# Patient Record
Sex: FEMALE | Race: WHITE | NOT HISPANIC OR LATINO | Employment: FULL TIME | URBAN - METROPOLITAN AREA
[De-identification: names, ages, dates, MRNs, and addresses within clinical notes are randomized per-mention and may not be internally consistent; named-entity substitution may affect disease eponyms.]

---

## 2021-03-01 ENCOUNTER — OFFICE VISIT (OUTPATIENT)
Dept: ENDOCRINOLOGY | Facility: CLINIC | Age: 31
End: 2021-03-01

## 2021-03-01 VITALS
DIASTOLIC BLOOD PRESSURE: 64 MMHG | HEART RATE: 100 BPM | BODY MASS INDEX: 24.79 KG/M2 | HEIGHT: 64 IN | SYSTOLIC BLOOD PRESSURE: 104 MMHG | OXYGEN SATURATION: 99 % | WEIGHT: 145.2 LBS | TEMPERATURE: 99.3 F

## 2021-03-01 DIAGNOSIS — E05.00 GRAVES' DISEASE: Primary | ICD-10-CM

## 2021-03-01 PROCEDURE — 99204 OFFICE O/P NEW MOD 45 MIN: CPT | Performed by: INTERNAL MEDICINE

## 2021-03-01 RX ORDER — ATENOLOL 25 MG/1
TABLET ORAL
COMMUNITY
Start: 2021-02-19 | End: 2021-04-05

## 2021-03-01 RX ORDER — ESCITALOPRAM OXALATE 10 MG/1
10 TABLET ORAL EVERY MORNING
COMMUNITY
Start: 2021-02-25 | End: 2021-10-18

## 2021-03-01 RX ORDER — CLONAZEPAM 0.5 MG/1
0.5 TABLET ORAL AS NEEDED
COMMUNITY
Start: 2021-02-22 | End: 2021-10-18

## 2021-03-01 RX ORDER — PROPYLTHIOURACIL 50 MG/1
50 TABLET ORAL 3 TIMES DAILY
COMMUNITY
End: 2021-04-08 | Stop reason: HOSPADM

## 2021-03-01 NOTE — ASSESSMENT & PLAN NOTE
A new problem for me.  She has gone from hyper to hypo to normal to hyper to hypo again. We know that 300 mg of ptu per day is too much, 0 is not enough and 150 is too much.  I think she needs a different treatment plan. As she is hypothyroid now , I would stop her beta blocker.  She ought to consider thyroidectomy.  If she decides not to go with that, then combining ptu with levothyroxine might be the way to go

## 2021-03-01 NOTE — PROGRESS NOTES
"     Office Note      Date: 2021  Patient Name: Maye Madlonado  MRN: [id number]  : 1990    Chief Complaint   Patient presents with   • Graves' Disease   • Hyperthyroidism       History of Present Illness:   Maye Maldonado is a 30 y.o. female who presents for Graves' Disease and Hyperthyroidism   She is seen as a new pt today.  She was diagnosed with graves disease last summer and treated with 300 mg of ptu per day  She became severely hypothyroid on that. The dose was stopped. She was then hyperthyroid and was put on 150 mg per day. Now tsh is about 10.  She notes hair loss and fatigue.  She is trying to conceive   Subjective      Patient was born where: [place].  Facial radiation exposure: No.  High iodine intake: No  Family hx of thyroid disease: Yes, describe: mgm.    Review of Systems:   Review of Systems   Constitutional: Positive for fatigue.   HENT: Negative.    Respiratory: Negative.    Cardiovascular: Negative.    Gastrointestinal: Negative.    Genitourinary: Negative.    Musculoskeletal: Negative.    Skin: Negative.         Hair loss   Neurological: Positive for tremors and weakness.   Hematological: Negative.    Psychiatric/Behavioral: Negative.        The following portions of the patient's history were reviewed and updated as appropriate: allergies, current medications, past family history, past medical history, past social history, past surgical history and problem list.    Objective     Visit Vitals  /64   Pulse 100   Temp 99.3 °F (37.4 °C) (Infrared)   Ht 162.6 cm (64\")   Wt 65.9 kg (145 lb 3.2 oz)   SpO2 99%   BMI 24.92 kg/m²       Labs:    Physical Exam:  Physical Exam  Vitals signs reviewed.   Constitutional:       General: She is not in acute distress.     Appearance: She is normal weight. She is not ill-appearing, toxic-appearing or diaphoretic.   HENT:      Head: Normocephalic and atraumatic.   Eyes:      Extraocular Movements: Extraocular movements intact.      Pupils: Pupils " are equal, round, and reactive to light.   Neck:      Comments: + goiter  Cardiovascular:      Rate and Rhythm: Regular rhythm. Tachycardia present.      Pulses: Normal pulses.      Heart sounds: No murmur.   Pulmonary:      Effort: Pulmonary effort is normal. No respiratory distress.   Musculoskeletal: Normal range of motion.         General: No swelling.   Lymphadenopathy:      Cervical: No cervical adenopathy.   Skin:     General: Skin is warm.      Coloration: Skin is not jaundiced.   Neurological:      General: No focal deficit present.      Mental Status: She is alert and oriented to person, place, and time.   Psychiatric:         Mood and Affect: Mood normal.         Thought Content: Thought content normal.         Judgment: Judgment normal.         Assessment / Plan      Assessment & Plan:  Problem List Items Addressed This Visit        Other    Graves' disease - Primary    Current Assessment & Plan     A new problem for me.  She has gone from hyper to hypo to normal to hyper to hypo again. We know that 300 mg of ptu per day is too much, 0 is not enough and 150 is too much.  I think she needs a different treatment plan. As she is hypothyroid now , I would stop her beta blocker.  She ought to consider thyroidectomy.  If she decides not to go with that, then combining ptu with levothyroxine might be the way to go          Relevant Medications    atenolol (TENORMIN) 25 MG tablet    propylthiouracil (PTU) 50 MG tablet           Bharath Argueta MD   03/01/2021

## 2021-03-02 DIAGNOSIS — E05.00 GRAVES' DISEASE: Primary | ICD-10-CM

## 2021-04-05 ENCOUNTER — APPOINTMENT (OUTPATIENT)
Dept: PREADMISSION TESTING | Facility: HOSPITAL | Age: 31
End: 2021-04-05

## 2021-04-05 VITALS — HEIGHT: 64 IN | BODY MASS INDEX: 25.54 KG/M2 | WEIGHT: 149.6 LBS

## 2021-04-05 LAB
ANION GAP SERPL CALCULATED.3IONS-SCNC: 9 MMOL/L (ref 5–15)
BUN SERPL-MCNC: 9 MG/DL (ref 6–20)
BUN/CREAT SERPL: 13.2 (ref 7–25)
CALCIUM SPEC-SCNC: 8.6 MG/DL (ref 8.6–10.5)
CHLORIDE SERPL-SCNC: 104 MMOL/L (ref 98–107)
CO2 SERPL-SCNC: 26 MMOL/L (ref 22–29)
CREAT SERPL-MCNC: 0.68 MG/DL (ref 0.57–1)
DEPRECATED RDW RBC AUTO: 40 FL (ref 37–54)
ERYTHROCYTE [DISTWIDTH] IN BLOOD BY AUTOMATED COUNT: 13.2 % (ref 12.3–15.4)
GFR SERPL CREATININE-BSD FRML MDRD: 100 ML/MIN/1.73
GLUCOSE SERPL-MCNC: 97 MG/DL (ref 65–99)
HCT VFR BLD AUTO: 37.8 % (ref 34–46.6)
HGB BLD-MCNC: 11.8 G/DL (ref 12–15.9)
MCH RBC QN AUTO: 26.2 PG (ref 26.6–33)
MCHC RBC AUTO-ENTMCNC: 31.2 G/DL (ref 31.5–35.7)
MCV RBC AUTO: 84 FL (ref 79–97)
PLATELET # BLD AUTO: 152 10*3/MM3 (ref 140–450)
PMV BLD AUTO: 9.9 FL (ref 6–12)
POTASSIUM SERPL-SCNC: 4.2 MMOL/L (ref 3.5–5.2)
QT INTERVAL: 336 MS
QTC INTERVAL: 394 MS
RBC # BLD AUTO: 4.5 10*6/MM3 (ref 3.77–5.28)
SARS-COV-2 RNA NOSE QL NAA+PROBE: NOT DETECTED
SODIUM SERPL-SCNC: 139 MMOL/L (ref 136–145)
WBC # BLD AUTO: 5.17 10*3/MM3 (ref 3.4–10.8)

## 2021-04-05 PROCEDURE — 93010 ELECTROCARDIOGRAM REPORT: CPT | Performed by: INTERNAL MEDICINE

## 2021-04-05 PROCEDURE — C9803 HOPD COVID-19 SPEC COLLECT: HCPCS

## 2021-04-05 PROCEDURE — U0004 COV-19 TEST NON-CDC HGH THRU: HCPCS

## 2021-04-05 PROCEDURE — 80048 BASIC METABOLIC PNL TOTAL CA: CPT

## 2021-04-05 PROCEDURE — 36415 COLL VENOUS BLD VENIPUNCTURE: CPT

## 2021-04-05 PROCEDURE — 85027 COMPLETE CBC AUTOMATED: CPT

## 2021-04-05 PROCEDURE — 93005 ELECTROCARDIOGRAM TRACING: CPT

## 2021-04-05 NOTE — PAT
"Patient to apply Chlorhexadine wipes  to surgical area (as instructed) the night before procedure and the AM of procedure. Wipes provided.    An arrival time for procedure was not given during PAT visit. If patient had any questions or concerns about their arrival time, they were instructed to contact their surgeon/physician.  Additionally, if the patient referred to an arrival time that was acquired from their my chart account, patient was encouraged to verify that time with their surgeon/physician.  NO arrival times given in Pre Admission Testing Department.    Patient has fake eyelashes and requests that \"gauze be used on eyes versus tape\" during surgery.  "

## 2021-04-06 ENCOUNTER — ANESTHESIA EVENT (OUTPATIENT)
Dept: PERIOP | Facility: HOSPITAL | Age: 31
End: 2021-04-06

## 2021-04-06 RX ORDER — SODIUM CHLORIDE 0.9 % (FLUSH) 0.9 %
10 SYRINGE (ML) INJECTION EVERY 12 HOURS SCHEDULED
Status: CANCELLED | OUTPATIENT
Start: 2021-04-06

## 2021-04-06 RX ORDER — SODIUM CHLORIDE 0.9 % (FLUSH) 0.9 %
10 SYRINGE (ML) INJECTION AS NEEDED
Status: CANCELLED | OUTPATIENT
Start: 2021-04-06

## 2021-04-06 RX ORDER — FAMOTIDINE 10 MG/ML
20 INJECTION, SOLUTION INTRAVENOUS ONCE
Status: CANCELLED | OUTPATIENT
Start: 2021-04-06 | End: 2021-04-06

## 2021-04-07 ENCOUNTER — ANESTHESIA (OUTPATIENT)
Dept: PERIOP | Facility: HOSPITAL | Age: 31
End: 2021-04-07

## 2021-04-07 ENCOUNTER — HOSPITAL ENCOUNTER (OUTPATIENT)
Facility: HOSPITAL | Age: 31
Discharge: HOME | End: 2021-04-08
Attending: SURGERY | Admitting: SURGERY

## 2021-04-07 DIAGNOSIS — E05.00 GRAVES' DISEASE: Primary | ICD-10-CM

## 2021-04-07 DIAGNOSIS — E07.9 THYROID MASS: ICD-10-CM

## 2021-04-07 LAB
B-HCG UR QL: NEGATIVE
INTERNAL NEGATIVE CONTROL: NEGATIVE
INTERNAL POSITIVE CONTROL: POSITIVE
Lab: NORMAL

## 2021-04-07 PROCEDURE — 88307 TISSUE EXAM BY PATHOLOGIST: CPT | Performed by: SURGERY

## 2021-04-07 PROCEDURE — 25010000002 DEXAMETHASONE PER 1 MG: Performed by: NURSE ANESTHETIST, CERTIFIED REGISTERED

## 2021-04-07 PROCEDURE — 25010000002 MORPHINE PER 10 MG: Performed by: SURGERY

## 2021-04-07 PROCEDURE — 25010000002 HYDROMORPHONE PER 4 MG: Performed by: NURSE ANESTHETIST, CERTIFIED REGISTERED

## 2021-04-07 PROCEDURE — 25010000002 ONDANSETRON PER 1 MG: Performed by: SURGERY

## 2021-04-07 PROCEDURE — 25010000002 ONDANSETRON PER 1 MG: Performed by: NURSE ANESTHETIST, CERTIFIED REGISTERED

## 2021-04-07 PROCEDURE — 25010000002 FENTANYL CITRATE (PF) 250 MCG/5ML SOLUTION: Performed by: NURSE ANESTHETIST, CERTIFIED REGISTERED

## 2021-04-07 PROCEDURE — 60240 REMOVAL OF THYROID: CPT | Performed by: PHYSICIAN ASSISTANT

## 2021-04-07 PROCEDURE — 25010000002 PROPOFOL 10 MG/ML EMULSION: Performed by: NURSE ANESTHETIST, CERTIFIED REGISTERED

## 2021-04-07 PROCEDURE — 25010000002 FENTANYL CITRATE (PF) 100 MCG/2ML SOLUTION: Performed by: NURSE ANESTHETIST, CERTIFIED REGISTERED

## 2021-04-07 PROCEDURE — 81025 URINE PREGNANCY TEST: CPT | Performed by: ANESTHESIOLOGY

## 2021-04-07 PROCEDURE — 25010000002 NEOSTIGMINE 10 MG/10ML SOLUTION: Performed by: NURSE ANESTHETIST, CERTIFIED REGISTERED

## 2021-04-07 PROCEDURE — 25010000002 MIDAZOLAM PER 1 MG: Performed by: ANESTHESIOLOGY

## 2021-04-07 PROCEDURE — G0378 HOSPITAL OBSERVATION PER HR: HCPCS

## 2021-04-07 DEVICE — LIGACLIP MCA MULTIPLE CLIP APPLIERS, 20 SMALL CLIPS
Type: IMPLANTABLE DEVICE | Site: NECK | Status: FUNCTIONAL
Brand: LIGACLIP

## 2021-04-07 DEVICE — KT HEMOST ABS SURGIFOAM PORCN 1GRAM: Type: IMPLANTABLE DEVICE | Site: NECK | Status: FUNCTIONAL

## 2021-04-07 DEVICE — LIGACLIP MCA MULTIPLE CLIP APPLIERS, 20 MEDIUM CLIPS
Type: IMPLANTABLE DEVICE | Site: NECK | Status: FUNCTIONAL
Brand: LIGACLIP

## 2021-04-07 RX ORDER — MIDAZOLAM HYDROCHLORIDE 1 MG/ML
2 INJECTION INTRAMUSCULAR; INTRAVENOUS
Status: DISCONTINUED | OUTPATIENT
Start: 2021-04-07 | End: 2021-04-07 | Stop reason: HOSPADM

## 2021-04-07 RX ORDER — DEXAMETHASONE SODIUM PHOSPHATE 4 MG/ML
INJECTION, SOLUTION INTRA-ARTICULAR; INTRALESIONAL; INTRAMUSCULAR; INTRAVENOUS; SOFT TISSUE AS NEEDED
Status: DISCONTINUED | OUTPATIENT
Start: 2021-04-07 | End: 2021-04-07 | Stop reason: SURG

## 2021-04-07 RX ORDER — ONDANSETRON 2 MG/ML
4 INJECTION INTRAMUSCULAR; INTRAVENOUS ONCE AS NEEDED
Status: DISCONTINUED | OUTPATIENT
Start: 2021-04-07 | End: 2021-04-07 | Stop reason: HOSPADM

## 2021-04-07 RX ORDER — MORPHINE SULFATE 4 MG/ML
4 INJECTION, SOLUTION INTRAMUSCULAR; INTRAVENOUS
Status: DISCONTINUED | OUTPATIENT
Start: 2021-04-07 | End: 2021-04-08 | Stop reason: HOSPADM

## 2021-04-07 RX ORDER — PROMETHAZINE HYDROCHLORIDE 12.5 MG/1
12.5 TABLET ORAL EVERY 6 HOURS PRN
Status: DISCONTINUED | OUTPATIENT
Start: 2021-04-07 | End: 2021-04-08 | Stop reason: HOSPADM

## 2021-04-07 RX ORDER — ACETAMINOPHEN 325 MG/1
650 TABLET ORAL EVERY 4 HOURS PRN
Status: DISCONTINUED | OUTPATIENT
Start: 2021-04-07 | End: 2021-04-08 | Stop reason: HOSPADM

## 2021-04-07 RX ORDER — ESCITALOPRAM OXALATE 10 MG/1
10 TABLET ORAL EVERY MORNING
Status: DISCONTINUED | OUTPATIENT
Start: 2021-04-07 | End: 2021-04-08 | Stop reason: HOSPADM

## 2021-04-07 RX ORDER — NALOXONE HCL 0.4 MG/ML
0.4 VIAL (ML) INJECTION
Status: DISCONTINUED | OUTPATIENT
Start: 2021-04-07 | End: 2021-04-08 | Stop reason: HOSPADM

## 2021-04-07 RX ORDER — SODIUM CHLORIDE, SODIUM LACTATE, POTASSIUM CHLORIDE, CALCIUM CHLORIDE 600; 310; 30; 20 MG/100ML; MG/100ML; MG/100ML; MG/100ML
9 INJECTION, SOLUTION INTRAVENOUS CONTINUOUS
Status: DISCONTINUED | OUTPATIENT
Start: 2021-04-07 | End: 2021-04-08 | Stop reason: HOSPADM

## 2021-04-07 RX ORDER — PROPOFOL 10 MG/ML
VIAL (ML) INTRAVENOUS AS NEEDED
Status: DISCONTINUED | OUTPATIENT
Start: 2021-04-07 | End: 2021-04-07 | Stop reason: SURG

## 2021-04-07 RX ORDER — LIDOCAINE HYDROCHLORIDE 10 MG/ML
INJECTION, SOLUTION EPIDURAL; INFILTRATION; INTRACAUDAL; PERINEURAL AS NEEDED
Status: DISCONTINUED | OUTPATIENT
Start: 2021-04-07 | End: 2021-04-07 | Stop reason: SURG

## 2021-04-07 RX ORDER — HYDROMORPHONE HYDROCHLORIDE 1 MG/ML
0.5 INJECTION, SOLUTION INTRAMUSCULAR; INTRAVENOUS; SUBCUTANEOUS
Status: DISCONTINUED | OUTPATIENT
Start: 2021-04-07 | End: 2021-04-07 | Stop reason: HOSPADM

## 2021-04-07 RX ORDER — ONDANSETRON 4 MG/1
4 TABLET, FILM COATED ORAL EVERY 6 HOURS PRN
Status: DISCONTINUED | OUTPATIENT
Start: 2021-04-07 | End: 2021-04-08 | Stop reason: HOSPADM

## 2021-04-07 RX ORDER — SODIUM CHLORIDE, SODIUM LACTATE, POTASSIUM CHLORIDE, CALCIUM CHLORIDE 600; 310; 30; 20 MG/100ML; MG/100ML; MG/100ML; MG/100ML
50 INJECTION, SOLUTION INTRAVENOUS CONTINUOUS
Status: DISCONTINUED | OUTPATIENT
Start: 2021-04-07 | End: 2021-04-08 | Stop reason: HOSPADM

## 2021-04-07 RX ORDER — GLYCOPYRROLATE 0.2 MG/ML
INJECTION INTRAMUSCULAR; INTRAVENOUS AS NEEDED
Status: DISCONTINUED | OUTPATIENT
Start: 2021-04-07 | End: 2021-04-07 | Stop reason: SURG

## 2021-04-07 RX ORDER — LIDOCAINE HYDROCHLORIDE 10 MG/ML
0.5 INJECTION, SOLUTION EPIDURAL; INFILTRATION; INTRACAUDAL; PERINEURAL ONCE AS NEEDED
Status: COMPLETED | OUTPATIENT
Start: 2021-04-07 | End: 2021-04-07

## 2021-04-07 RX ORDER — OXYCODONE HYDROCHLORIDE AND ACETAMINOPHEN 5; 325 MG/1; MG/1
1 TABLET ORAL EVERY 4 HOURS PRN
Status: DISCONTINUED | OUTPATIENT
Start: 2021-04-07 | End: 2021-04-08 | Stop reason: HOSPADM

## 2021-04-07 RX ORDER — ATRACURIUM BESYLATE 10 MG/ML
INJECTION, SOLUTION INTRAVENOUS AS NEEDED
Status: DISCONTINUED | OUTPATIENT
Start: 2021-04-07 | End: 2021-04-07 | Stop reason: SURG

## 2021-04-07 RX ORDER — FENTANYL CITRATE 50 UG/ML
INJECTION, SOLUTION INTRAMUSCULAR; INTRAVENOUS AS NEEDED
Status: DISCONTINUED | OUTPATIENT
Start: 2021-04-07 | End: 2021-04-07 | Stop reason: SURG

## 2021-04-07 RX ORDER — FENTANYL CITRATE 50 UG/ML
50 INJECTION, SOLUTION INTRAMUSCULAR; INTRAVENOUS
Status: DISCONTINUED | OUTPATIENT
Start: 2021-04-07 | End: 2021-04-07 | Stop reason: HOSPADM

## 2021-04-07 RX ORDER — FAMOTIDINE 20 MG/1
20 TABLET, FILM COATED ORAL ONCE
Status: COMPLETED | OUTPATIENT
Start: 2021-04-07 | End: 2021-04-07

## 2021-04-07 RX ORDER — MIDAZOLAM HYDROCHLORIDE 1 MG/ML
1 INJECTION INTRAMUSCULAR; INTRAVENOUS
Status: DISCONTINUED | OUTPATIENT
Start: 2021-04-07 | End: 2021-04-07 | Stop reason: HOSPADM

## 2021-04-07 RX ORDER — PROMETHAZINE HYDROCHLORIDE 12.5 MG/1
12.5 SUPPOSITORY RECTAL EVERY 6 HOURS PRN
Status: DISCONTINUED | OUTPATIENT
Start: 2021-04-07 | End: 2021-04-08 | Stop reason: HOSPADM

## 2021-04-07 RX ORDER — CLONAZEPAM 0.5 MG/1
0.5 TABLET ORAL AS NEEDED
Status: DISCONTINUED | OUTPATIENT
Start: 2021-04-07 | End: 2021-04-08 | Stop reason: HOSPADM

## 2021-04-07 RX ORDER — ONDANSETRON 2 MG/ML
4 INJECTION INTRAMUSCULAR; INTRAVENOUS EVERY 6 HOURS PRN
Status: DISCONTINUED | OUTPATIENT
Start: 2021-04-07 | End: 2021-04-08 | Stop reason: HOSPADM

## 2021-04-07 RX ORDER — NEOSTIGMINE METHYLSULFATE 1 MG/ML
INJECTION, SOLUTION INTRAVENOUS AS NEEDED
Status: DISCONTINUED | OUTPATIENT
Start: 2021-04-07 | End: 2021-04-07 | Stop reason: SURG

## 2021-04-07 RX ORDER — MAGNESIUM HYDROXIDE 1200 MG/15ML
LIQUID ORAL AS NEEDED
Status: DISCONTINUED | OUTPATIENT
Start: 2021-04-07 | End: 2021-04-07 | Stop reason: HOSPADM

## 2021-04-07 RX ORDER — ONDANSETRON 2 MG/ML
INJECTION INTRAMUSCULAR; INTRAVENOUS AS NEEDED
Status: DISCONTINUED | OUTPATIENT
Start: 2021-04-07 | End: 2021-04-07 | Stop reason: SURG

## 2021-04-07 RX ADMIN — PROPOFOL 200 MG: 10 INJECTION, EMULSION INTRAVENOUS at 08:04

## 2021-04-07 RX ADMIN — ATRACURIUM BESYLATE 50 MG: 10 INJECTION, SOLUTION INTRAVENOUS at 08:04

## 2021-04-07 RX ADMIN — ONDANSETRON 4 MG: 2 INJECTION INTRAMUSCULAR; INTRAVENOUS at 11:38

## 2021-04-07 RX ADMIN — DEXAMETHASONE SODIUM PHOSPHATE 8 MG: 4 INJECTION, SOLUTION INTRA-ARTICULAR; INTRALESIONAL; INTRAMUSCULAR; INTRAVENOUS; SOFT TISSUE at 08:04

## 2021-04-07 RX ADMIN — HYDROMORPHONE HYDROCHLORIDE 0.5 MG: 1 INJECTION, SOLUTION INTRAMUSCULAR; INTRAVENOUS; SUBCUTANEOUS at 10:45

## 2021-04-07 RX ADMIN — SODIUM CHLORIDE, POTASSIUM CHLORIDE, SODIUM LACTATE AND CALCIUM CHLORIDE 9 ML/HR: 600; 310; 30; 20 INJECTION, SOLUTION INTRAVENOUS at 06:50

## 2021-04-07 RX ADMIN — MIDAZOLAM 2 MG: 1 INJECTION INTRAMUSCULAR; INTRAVENOUS at 07:16

## 2021-04-07 RX ADMIN — MORPHINE SULFATE 4 MG: 4 INJECTION, SOLUTION INTRAMUSCULAR; INTRAVENOUS at 11:38

## 2021-04-07 RX ADMIN — SODIUM CHLORIDE, POTASSIUM CHLORIDE, SODIUM LACTATE AND CALCIUM CHLORIDE 50 ML/HR: 600; 310; 30; 20 INJECTION, SOLUTION INTRAVENOUS at 11:39

## 2021-04-07 RX ADMIN — NEOSTIGMINE 2 MG: 1 INJECTION INTRAVENOUS at 09:16

## 2021-04-07 RX ADMIN — MORPHINE SULFATE 4 MG: 4 INJECTION, SOLUTION INTRAMUSCULAR; INTRAVENOUS at 20:07

## 2021-04-07 RX ADMIN — GLYCOPYRROLATE 0.1 MG: 0.4 INJECTION INTRAMUSCULAR; INTRAVENOUS at 09:16

## 2021-04-07 RX ADMIN — SODIUM CHLORIDE, POTASSIUM CHLORIDE, SODIUM LACTATE AND CALCIUM CHLORIDE: 600; 310; 30; 20 INJECTION, SOLUTION INTRAVENOUS at 09:12

## 2021-04-07 RX ADMIN — FENTANYL CITRATE 150 MCG: 50 INJECTION, SOLUTION INTRAMUSCULAR; INTRAVENOUS at 08:04

## 2021-04-07 RX ADMIN — FENTANYL CITRATE 100 MCG: 50 INJECTION, SOLUTION INTRAMUSCULAR; INTRAVENOUS at 08:47

## 2021-04-07 RX ADMIN — ONDANSETRON 4 MG: 2 INJECTION INTRAMUSCULAR; INTRAVENOUS at 16:04

## 2021-04-07 RX ADMIN — FAMOTIDINE 20 MG: 20 TABLET, FILM COATED ORAL at 07:13

## 2021-04-07 RX ADMIN — LIDOCAINE HYDROCHLORIDE 0.2 ML: 10 INJECTION, SOLUTION EPIDURAL; INFILTRATION; INTRACAUDAL; PERINEURAL at 06:50

## 2021-04-07 RX ADMIN — LIDOCAINE HYDROCHLORIDE 60 MG: 10 INJECTION, SOLUTION EPIDURAL; INFILTRATION; INTRACAUDAL; PERINEURAL at 08:04

## 2021-04-07 RX ADMIN — HYDROMORPHONE HYDROCHLORIDE 0.5 MG: 1 INJECTION, SOLUTION INTRAMUSCULAR; INTRAVENOUS; SUBCUTANEOUS at 10:31

## 2021-04-07 RX ADMIN — OXYCODONE HYDROCHLORIDE AND ACETAMINOPHEN 1 TABLET: 5; 325 TABLET ORAL at 17:28

## 2021-04-07 RX ADMIN — FENTANYL CITRATE 50 MCG: 50 INJECTION, SOLUTION INTRAMUSCULAR; INTRAVENOUS at 10:07

## 2021-04-07 RX ADMIN — ONDANSETRON 4 MG: 2 INJECTION INTRAMUSCULAR; INTRAVENOUS at 08:59

## 2021-04-07 RX ADMIN — METOPROLOL TARTRATE 2.5 MG: 5 INJECTION INTRAVENOUS at 09:26

## 2021-04-07 RX ADMIN — FENTANYL CITRATE 50 MCG: 50 INJECTION, SOLUTION INTRAMUSCULAR; INTRAVENOUS at 09:56

## 2021-04-07 NOTE — ANESTHESIA POSTPROCEDURE EVALUATION
Patient: Maye Maldonado    Procedure Summary     Date: 04/07/21 Room / Location:  [facility]    Anesthesia Start: 0801 Anesthesia Stop: 0945    Procedure: THYROIDECTOMY (N/A Neck) Diagnosis:     Surgeons: Alex Oconnell MD Provider: Mathew Smith MD    Anesthesia Type: general ASA Status: 2          Anesthesia Type: general    Vitals  Vitals Value Taken Time   /77 04/07/21 0945   Temp 97.4 °F (36.3 °C) 04/07/21 0945   Pulse 77 04/07/21 0945   Resp 16 04/07/21 0945   SpO2 96 % 04/07/21 0945           Post Anesthesia Care and Evaluation    Patient location during evaluation: PACU  Patient participation: complete - patient participated  Level of consciousness: awake and alert  Pain management: adequate  Airway patency: patent  Anesthetic complications: No anesthetic complications  PONV Status: none  Cardiovascular status: acceptable  Respiratory status: acceptable  Hydration status: acceptable

## 2021-04-07 NOTE — PLAN OF CARE
Goal Outcome Evaluation:  Plan of Care Reviewed With: patient  Progress: improving  Outcome Summary: Pt s/p thyroidectomy, VSS. RA. Neck incision CDI. Steri strips, telfa, tegaderm in place. Pt up to bathroom. Intermittent nausea. IV fluids infusing. Will continue to monitor.

## 2021-04-07 NOTE — ANESTHESIA PREPROCEDURE EVALUATION
Anesthesia Evaluation     Patient summary reviewed and Nursing notes reviewed   no history of anesthetic complications:  NPO Solid Status: > 8 hours  NPO Liquid Status: > 2 hours           Airway   Mallampati: II  TM distance: >3 FB  Neck ROM: full  No difficulty expected  Dental - normal exam     Pulmonary - negative pulmonary ROS and normal exam    breath sounds clear to auscultation  Cardiovascular - negative cardio ROS and normal exam    ECG reviewed  Rhythm: regular  Rate: normal        Neuro/Psych- negative ROS  GI/Hepatic/Renal/Endo    (+)   thyroid problem (Graves dz)     Musculoskeletal (-) negative ROS    Abdominal    Substance History      OB/GYN          Other - negative ROS                       Anesthesia Plan    ASA 2     general     intravenous induction     Anesthetic plan, all risks, benefits, and alternatives have been provided, discussed and informed consent has been obtained with: patient.    Plan discussed with CRNA.

## 2021-04-07 NOTE — PROGRESS NOTES
"GEN SURG PROGRESS NOTE     LOS: 0 days   Patient Care Team:  Nneka Escamilla APRN as PCP - General (Nurse Practitioner)  Bharath Argueta MD as Consulting Physician (Endocrinology)    Chief Complaint:     Subjective     Interval History:     Patient Complaints: none  Patient Denies:    History taken from: patient    Review of Systems:        Objective     Vital Signs  /75 (BP Location: Right arm, Patient Position: Sitting)   Pulse 110   Temp 98.1 °F (36.7 °C) (Oral)   Resp 18   Ht 162.6 cm (64\")   Wt 67.6 kg (149 lb)   LMP 03/17/2021 Comment: no mammogram yet   SpO2 95%   Breastfeeding No   BMI 25.58 kg/m²     Physical Exam:   Voice clear.  Dressing dry.  No neck swelling.     Results Review:     I reviewed the patient's new clinical results.  Labs  Lab Results (last 72 hours)     Procedure Component Value Units Date/Time    Tissue Pathology Exam [[id number]] Collected: 04/07/21 0819    Specimen: Tissue from Thyroid Updated: 04/07/21 1045    POC Urine Pregnancy Test [[id number]]  (Normal) Collected: 04/07/21 0714    Specimen: Urine Updated: 04/07/21 0715     HCG, Urine, QL Negative     Lot Number [id number]     Internal Positive Control Positive     Internal Negative Control Negative          Rads  Imaging Results (Last 72 Hours)     ** No results found for the last 72 hours. **          Assessment/Plan    Stable postop      Thyroid mass        Alex Oconnell MD  04/07/21  12:28 EDT        "

## 2021-04-07 NOTE — OP NOTE
THYROIDECTOMY  Procedure Note    Maye Maldonado  [id number]   1990     Date of Surgery:  4/7/2021    Pre-op Diagnosis:   Graves' disease    Post-op Diagnosis:     Same    Operative Procedure:   Total thyroidectomy    Surgeon: Alex Oconnell MD     Circulator: Ashley Palomino RN  Scrub Person: Mayte Byrd; Igor Rowley RN  Assistant: Que Chase PA     Assistant: Que Chase PA    Anesthesia: General     Indications:   Ms. Martinez is a 31-year-old female with Graves' disease.  She is elected to have surgical treatment.  She presents today for thyroidectomy.    Operative note:  On 4/7/2021, the patient was taken to the operating room and placed supine on the operating table.  Following adequate induction of general endotracheal anesthesia, a roll was placed underneath the shoulders and the head neck carefully extended.  The neck and chest were prepped and draped in the standard fashion.  A transverse incision was made in the lower third of the neck.  The subcutaneous tissue was divided with the cautery.  A platysmal flap was raised to the level of the thyroid cartilage superiorly and the clavicular heads inferiorly.  The strap muscles were split in the midline.  All dissection was done under 3-1/2 power magnification.  The left side was approached first by bluntly dissecting the strap muscles off and enlarged thyroid lobe.  Several small venous tributaries were divided on the substance of the gland with the bipolar cautery.  The superior pole vessels were next isolated and divided between clips.  The gland was rotated medially and both the superior and inferior parathyroid glands identified.  These were dissected away from the substance of the thyroid, maintaining their vascular pedicles.  The recurrent laryngeal nerve was identified coursing in the tracheoesophageal groove.  The left thyroid lobe was then carefully and gently mobilized away from the level of the nerve with a  combination of blunt dissection and the bipolar cautery.  The gland was further mobilized across the anterior trachea to include the isthmus.  Next, the right side was approached in a similar manner by bluntly dissecting the strap muscles off another enlarged thyroid lobe.  Several small venous tributaries were divided on the substance of the gland with the bipolar cautery.  The superior pole vessels were next isolated and divided between clips.  The gland was rotated medially and, again, both superior and inferior parathyroid glands identified.  These were dissected away from the substance of the gland, maintaining their vascular pedicles.  The recurrent laryngeal nerve was identified coursing somewhat obliquely in the tracheoesophageal groove.  The right thyroid lobe was then carefully and gently mobilized away from the level of the nerve with a combination of blunt dissection and the bipolar cautery.  The right lobe and remaining isthmus were then mobilized across the anterior trachea and the entire gland removed from the neck.  The the gland was inspected and there was no evidence of retained parathyroid tissue.  The neck was then irrigated.  Hemostasis was excellent.  The parathyroid glands appeared to be viable.  Both recurrent laryngeal nerves were traced from the origin low in the neck up to their insertion into the cricothyroid membrane and appeared uninjured.  Gelfoam powder was then sprayed into the wound and the strap muscles were reapproximated in the midline with running 3-0 Vicryl.  The subcutaneous tissue was reapproximated with running 3-0 Vicryl, and the skin closed with a running 4-0 Vicryl subcuticular suture.  A sterile dressing was applied in the operating room.  The patient tolerated the procedure well and there were no complications.  Estimated blood loss is 10 to 15 mL.  Both preoperative and postoperative sponge and needle counts were correct.  The patient was taken to the recovery room in  satisfactory condition.    Assistant: Que Chase PA  was responsible for performing the following activities: Retraction, Suction, Irrigation, Closing and Placing Dressing and their skilled assistance was necessary for the success of this case.    Alex Oconnell MD     Date: 4/7/2021  Time: 09:35 EDT

## 2021-04-07 NOTE — ANESTHESIA PROCEDURE NOTES
Airway  Urgency: elective    Date/Time: 4/7/2021 8:15 AM  Airway not difficult    General Information and Staff    Patient location during procedure: OR    Indications and Patient Condition  Indications for airway management: airway protection    Preoxygenated: yes  MILS not maintained throughout  Mask difficulty assessment: 1 - vent by mask    Final Airway Details  Final airway type: endotracheal airway      Successful airway: ETT  Cuffed: yes   Successful intubation technique: direct laryngoscopy  Endotracheal tube insertion site: oral  Blade: Miguel Angel  Blade size: 3  ETT size (mm): 6.5  Cormack-Lehane Classification: grade I - full view of glottis  Placement verified by: chest auscultation and capnometry   Measured from: lips  ETT/EBT  to lips (cm): 20  Number of attempts at approach: 1  Assessment: lips, teeth, and gum same as pre-op and atraumatic intubation    Additional Comments  Negative epigastric sounds, Breath sound equal bilaterally with symmetric chest rise and fall

## 2021-04-07 NOTE — INTERVAL H&P NOTE
"Pre-Op H&P (See Recent Office Note Attached for Full H&P)    Chief complaint: Graves disease    HPI:      Patient is a 31 y.o. female who presents with a diagnosis of Graves disease. Surgical intervention is recommended and she is agreeable. She is here today for a thyroidectomy.    Review of Systems:  General ROS:  no fever, chills, rashes.  No change since last office visit.  No recent sick exposure  Cardiovascular ROS: no chest pain or dyspnea on exertion  Respiratory ROS: no cough, shortness of breath, or wheezing  Endocrine ROS: +Graves disease    Meds:    No current facility-administered medications on file prior to encounter.     Current Outpatient Medications on File Prior to Encounter   Medication Sig Dispense Refill    clonazePAM (KlonoPIN) 0.5 MG tablet Take 0.5 mg by mouth As Needed for Anxiety. As needed      escitalopram (LEXAPRO) 10 MG tablet Take 10 mg by mouth Every Morning.      propylthiouracil (PTU) 50 MG tablet Take 50 mg by mouth 3 (Three) Times a Day.         Vital Signs:  /91 (BP Location: Right arm, Patient Position: Sitting)   Temp 98.4 °F (36.9 °C) (Temporal)   Resp 18   Ht 162.6 cm (64\")   Wt 67.6 kg (149 lb)   LMP 03/17/2021 Comment: no mammogram yet   SpO2 100%   Breastfeeding No   BMI 25.58 kg/m²     Physical Exam:    CV:  S1S2 regular rate and rhythm, no murmur               Resp:  Clear to auscultation; respirations regular, even and unlabored    Results Review:     Lab Results   Component Value Date    WBC 5.17 04/05/2021    HGB 11.8 (L) 04/05/2021    HCT 37.8 04/05/2021    MCV 84.0 04/05/2021     04/05/2021    GLUCOSE 97 04/05/2021    BUN 9 04/05/2021    CREATININE 0.68 04/05/2021    EGFRIFNONA 100 04/05/2021     04/05/2021    K 4.2 04/05/2021     04/05/2021    CO2 26.0 04/05/2021    CALCIUM 8.6 04/05/2021        I reviewed the patient's new clinical results.    Cancer Staging (if applicable)  Cancer Patient: __ yes _X_no __unknown; If yes, clinical " stage T:__ N:__M:__, stage group or __N/A    Assessment: Graves disease    Plan: Thyroidectomy      Rebeca Doyle, APRN  4/7/2021   07:27 EDT

## 2021-04-08 VITALS
TEMPERATURE: 98.3 F | OXYGEN SATURATION: 98 % | BODY MASS INDEX: 25.44 KG/M2 | HEIGHT: 64 IN | WEIGHT: 149 LBS | RESPIRATION RATE: 16 BRPM | HEART RATE: 75 BPM | DIASTOLIC BLOOD PRESSURE: 65 MMHG | SYSTOLIC BLOOD PRESSURE: 105 MMHG

## 2021-04-08 LAB
CA-I SERPL ISE-MCNC: 1.17 MMOL/L (ref 1.12–1.32)
CYTO UR: NORMAL
LAB AP CASE REPORT: NORMAL
LAB AP CLINICAL INFORMATION: NORMAL
PATH REPORT.FINAL DX SPEC: NORMAL
PATH REPORT.GROSS SPEC: NORMAL

## 2021-04-08 PROCEDURE — 82330 ASSAY OF CALCIUM: CPT | Performed by: SURGERY

## 2021-04-08 PROCEDURE — G0378 HOSPITAL OBSERVATION PER HR: HCPCS

## 2021-04-08 RX ORDER — OXYCODONE HYDROCHLORIDE AND ACETAMINOPHEN 5; 325 MG/1; MG/1
1 TABLET ORAL EVERY 4 HOURS PRN
Qty: 10 TABLET | Refills: 0 | Status: SHIPPED | OUTPATIENT
Start: 2021-04-08 | End: 2021-04-14

## 2021-04-08 RX ORDER — LEVOTHYROXINE SODIUM 0.1 MG/1
100 TABLET ORAL DAILY
Qty: 30 TABLET | Refills: 11 | Status: SHIPPED | OUTPATIENT
Start: 2021-04-08 | End: 2021-10-18 | Stop reason: SDUPTHER

## 2021-04-08 RX ADMIN — OXYCODONE HYDROCHLORIDE AND ACETAMINOPHEN 1 TABLET: 5; 325 TABLET ORAL at 00:45

## 2021-04-08 RX ADMIN — ESCITALOPRAM OXALATE 10 MG: 10 TABLET ORAL at 06:26

## 2021-04-08 RX ADMIN — OXYCODONE HYDROCHLORIDE AND ACETAMINOPHEN 1 TABLET: 5; 325 TABLET ORAL at 04:54

## 2021-04-08 NOTE — DISCHARGE SUMMARY
"Date of Discharge:  4/8/2021    Discharge Diagnosis:   Graves' disease    Problem List:    Thyroid mass      Presenting Problem/History of Present Illness  Thyroid mass [E07.9]        Hospital Course  Patient is a 31 y.o. female presented with hyperthyroidism secondary to Graves' disease.  Her medical management has made her euthyroid.  On 4/7/2021, she underwent a total thyroidectomy.  Her postoperative course has been unremarkable.  Her pain is been well controlled, she has been able to ambulate and void, and she is tolerated a diet.  On the date of discharge, she is afebrile and in satisfactory condition.  Her voice is clear, her dressing dry, and there is no neck swelling.  An ionized calcium is pending at the time of this dictation.  She is discharged home in satisfactory condition with wound care and activity limitations thoroughly discussed with her.  She will resume her medications as at home with the exception of PTU, which she will stop.  In addition, a prescription for Percocet 5 #10 and Synthroid 100 mcg/day was given.  She will follow-up with me in my office in 2 weeks..      Procedures Performed  Procedure(s):  THYROIDECTOMY       Consults:   Consults     No orders found for last 30 day(s).          Pertinent Test Results:       Vital Signs  BP 99/62   Pulse 93   Temp 98.5 °F (36.9 °C)   Resp 16   Ht 162.6 cm (64\")   Wt 67.6 kg (149 lb)   LMP 03/17/2021 Comment: no mammogram yet   SpO2 96%   Breastfeeding No   BMI 25.58 kg/m²     Discharge Disposition  Where: home      Discharge Medications     Discharge Medications      New Medications      Instructions Start Date   levothyroxine 100 MCG tablet  Commonly known as: Synthroid   100 mcg, Oral, Daily      oxyCODONE-acetaminophen 5-325 MG per tablet  Commonly known as: PERCOCET   1 tablet, Oral, Every 4 Hours PRN         Continue These Medications      Instructions Start Date   clonazePAM 0.5 MG tablet  Commonly known as: KlonoPIN   0.5 mg, Oral, " As Needed, As needed      escitalopram 10 MG tablet  Commonly known as: LEXAPRO   10 mg, Oral, Every Morning         Stop These Medications    propylthiouracil 50 MG tablet  Commonly known as: PTU            Discharge Diet   Regular    Activity at Discharge  Ad dillan.    Follow-up Appointments  Dr. Oconnell in 2 weeks      Test Results Pending at Discharge  Final pathology      Alex Oconnell MD  04/08/21  07:12 EDT

## 2021-04-08 NOTE — PLAN OF CARE
Problem: Adult Inpatient Plan of Care  Goal: Plan of Care Review  Outcome: Ongoing, Progressing  Flowsheets  Taken 4/8/2021 0345 by Francesco Foster, RN  Outcome Summary: VSS. Resting well. RA. Incision CDI. UOP adq. PRN PO pain meds effective. Continue to monitor. DC probable for today.  Taken 4/7/2021 1530 by Lisa Meneses, RN  Progress: improving  Plan of Care Reviewed With: patient   Goal Outcome Evaluation:        Outcome Summary: VSS. Resting well. RA. Incision CDI. UOP adq. PRN PO pain meds effective. Continue to monitor. DC probable for today.

## 2021-04-16 ENCOUNTER — OFFICE VISIT (OUTPATIENT)
Dept: ENDOCRINOLOGY | Facility: CLINIC | Age: 31
End: 2021-04-16

## 2021-04-16 VITALS
SYSTOLIC BLOOD PRESSURE: 108 MMHG | HEIGHT: 64 IN | WEIGHT: 148.4 LBS | BODY MASS INDEX: 25.33 KG/M2 | DIASTOLIC BLOOD PRESSURE: 68 MMHG | TEMPERATURE: 98.2 F | HEART RATE: 98 BPM | OXYGEN SATURATION: 99 %

## 2021-04-16 DIAGNOSIS — E89.0 POSTOPERATIVE HYPOTHYROIDISM: Primary | ICD-10-CM

## 2021-04-16 PROBLEM — E05.00 GRAVES' DISEASE: Status: RESOLVED | Noted: 2021-03-01 | Resolved: 2021-04-16

## 2021-04-16 PROBLEM — E07.9 THYROID MASS: Status: RESOLVED | Noted: 2021-04-07 | Resolved: 2021-04-16

## 2021-04-16 PROCEDURE — 99213 OFFICE O/P EST LOW 20 MIN: CPT | Performed by: INTERNAL MEDICINE

## 2021-04-16 RX ORDER — ONDANSETRON 4 MG/1
TABLET, ORALLY DISINTEGRATING ORAL AS NEEDED
COMMUNITY
Start: 2021-04-13 | End: 2022-10-19

## 2021-04-16 NOTE — PROGRESS NOTES
"     Office Note      Date: 2021  Patient Name: Maye Maldonado  MRN: [id number]  : 1990    Chief Complaint   Patient presents with   • Thyroid Problem       History of Present Illness:   Maye Maldonado is a 31 y.o. female who presents for Thyroid Problem  SHE IS ABOUT 9 DAYS POST OP FROM THYROIDECTOMY FOR GRAVES DISEASE. PATHOLOGY WAS BENIGN.. CALCIUM WAS NORMAL VOICE IS OK.. NOT BACK TO NORMAL YET.  SHE IS ON LEVOTHYROXINE 100 MCG  PER DAY.  SHE HAS NO QUESTIONS.  SHE FEELS OK. NOW.  SOME FATIGUE     Subjective          Review of Systems:   Review of Systems   Constitutional: Negative.    HENT: Negative.    Eyes: Negative.    Respiratory: Negative.    All other systems reviewed and are negative.      The following portions of the patient's history were reviewed and updated as appropriate: allergies, current medications, past family history, past medical history, past social history, past surgical history and problem list.    Objective     Visit Vitals  /68   Pulse 98   Temp 98.2 °F (36.8 °C) (Infrared)   Ht 162.6 cm (64\")   Wt 67.3 kg (148 lb 6.4 oz)   LMP 2021 Comment: no mammogram yet    SpO2 99%   BMI 25.47 kg/m²       Labs:    CBC w/DIFF  Lab Results   Component Value Date    WBC 5.17 2021    RBC 4.50 2021    HGB 11.8 (L) 2021    HCT 37.8 2021    MCV 84.0 2021    MCH 26.2 (L) 2021    MCHC 31.2 (L) 2021    RDW 13.2 2021    RDWSD 40.0 2021    MPV 9.9 2021     2021       T4  No results found for: FREET4    TSH  No results found for: TSHBASE     Physical Exam:  Physical Exam  Vitals reviewed.   Constitutional:       Appearance: Normal appearance.   Neck:      Comments: HEALING INCISION   Lymphadenopathy:      Cervical: No cervical adenopathy.   Neurological:      Mental Status: She is alert and oriented to person, place, and time.   Psychiatric:         Mood and Affect: Mood normal.         Thought Content: " Thought content normal.         Judgment: Judgment normal.         Assessment / Plan      Assessment & Plan:  Problem List Items Addressed This Visit        Other    Postoperative hypothyroidism - Primary    Current Assessment & Plan     A NEW ISSUE SINCE SURGERY BUT DOING WELL.  WILL NEED LABS IN 3 WEEKS          Relevant Medications    levothyroxine (Synthroid) 100 MCG tablet           Bharath Argueta MD   04/16/2021

## 2021-10-18 ENCOUNTER — OFFICE VISIT (OUTPATIENT)
Dept: ENDOCRINOLOGY | Facility: CLINIC | Age: 31
End: 2021-10-18

## 2021-10-18 VITALS
HEIGHT: 64 IN | BODY MASS INDEX: 27.14 KG/M2 | OXYGEN SATURATION: 98 % | HEART RATE: 77 BPM | WEIGHT: 159 LBS | SYSTOLIC BLOOD PRESSURE: 104 MMHG | DIASTOLIC BLOOD PRESSURE: 70 MMHG

## 2021-10-18 DIAGNOSIS — E89.0 POSTOPERATIVE HYPOTHYROIDISM: Primary | ICD-10-CM

## 2021-10-18 PROCEDURE — 99213 OFFICE O/P EST LOW 20 MIN: CPT | Performed by: INTERNAL MEDICINE

## 2021-10-18 RX ORDER — LEVOTHYROXINE SODIUM 0.1 MG/1
100 TABLET ORAL DAILY
Qty: 30 TABLET | Refills: 11 | Status: SHIPPED | OUTPATIENT
Start: 2021-10-18 | End: 2021-12-14 | Stop reason: DRUGHIGH

## 2021-10-18 NOTE — PROGRESS NOTES
"     Office Note      Date: 10/18/2021  Patient Name: Maye Maldonado  MRN: [id number]  : 1990    Chief Complaint   Patient presents with   • Hypothyroidism   • Graves' Disease       History of Present Illness:   Maye Maldonado is a 31 y.o. female who presents for Hypothyroidism and Graves' Disease  SHE HAS HYPOTHYROIDSM FOLLOWING THYROIDECTOMY FOR GRAVES.  SHE  HAS BEEN GOING THROUGH [facility] AND IN THEIR PRE- TESTING  HER TSH  WAS NORMAL BY HER REPORT.  HER LAST LABS WAS IN AUGUST.  NO OCULAR ISSUES  SHE  FEELS BETTER NOW THAN SHE HAS IN YEARS.    Subjective          Review of Systems:   Review of Systems   Constitutional: Negative.    HENT: Negative.    Eyes: Negative.    Respiratory: Negative.        The following portions of the patient's history were reviewed and updated as appropriate: allergies, current medications, past family history, past medical history, past social history, past surgical history and problem list.    Objective     Visit Vitals  /70   Pulse 77   Ht 162.6 cm (64\")   Wt 72.1 kg (159 lb)   SpO2 98%   BMI 27.29 kg/m²       Labs:    CBC w/DIFF  Lab Results   Component Value Date    WBC 5.17 2021    RBC 4.50 2021    HGB 11.8 (L) 2021    HCT 37.8 2021    MCV 84.0 2021    MCH 26.2 (L) 2021    MCHC 31.2 (L) 2021    RDW 13.2 2021    RDWSD 40.0 2021    MPV 9.9 2021     2021       T4  No results found for: FREET4    TSH  No results found for: TSHBASE     Physical Exam:  Physical Exam  Vitals reviewed.   Eyes:      Extraocular Movements: Extraocular movements intact.   Neck:      Comments: NO PALPABLE THYROID TISSUE  Lymphadenopathy:      Cervical: No cervical adenopathy.   Neurological:      Mental Status: She is alert.         Assessment / Plan      Assessment & Plan:  Problem List Items Addressed This Visit        Other    Postoperative hypothyroidism - Primary    Current Assessment & Plan     CLINICALLY AND " CHEMICALLY EUTHYROID. ANNUAL FOLLOW UP SHOULD SUFFICE          Relevant Medications    levothyroxine (Synthroid) 100 MCG tablet           Bharath Argueta MD   10/18/2021

## 2021-12-02 DIAGNOSIS — E89.0 POSTOPERATIVE HYPOTHYROIDISM: Primary | ICD-10-CM

## 2021-12-14 DIAGNOSIS — E89.0 POSTOPERATIVE HYPOTHYROIDISM: Primary | ICD-10-CM

## 2021-12-14 RX ORDER — LEVOTHYROXINE SODIUM 0.15 MG/1
150 TABLET ORAL DAILY
Qty: 90 TABLET | Refills: 3 | Status: SHIPPED | OUTPATIENT
Start: 2021-12-14 | End: 2022-01-28

## 2021-12-15 DIAGNOSIS — E89.0 POSTOPERATIVE HYPOTHYROIDISM: ICD-10-CM

## 2022-01-28 RX ORDER — LEVOTHYROXINE SODIUM 0.2 MG/1
200 TABLET ORAL DAILY
Qty: 90 TABLET | Refills: 3 | Status: SHIPPED | OUTPATIENT
Start: 2022-01-28 | End: 2022-10-20

## 2022-08-10 DIAGNOSIS — E89.0 POSTOPERATIVE HYPOTHYROIDISM: Primary | ICD-10-CM

## 2022-10-19 ENCOUNTER — LAB (OUTPATIENT)
Dept: LAB | Facility: HOSPITAL | Age: 32
End: 2022-10-19

## 2022-10-19 ENCOUNTER — OFFICE VISIT (OUTPATIENT)
Dept: ENDOCRINOLOGY | Facility: CLINIC | Age: 32
End: 2022-10-19

## 2022-10-19 VITALS
OXYGEN SATURATION: 98 % | SYSTOLIC BLOOD PRESSURE: 112 MMHG | WEIGHT: 163 LBS | HEART RATE: 103 BPM | DIASTOLIC BLOOD PRESSURE: 68 MMHG | HEIGHT: 64 IN | BODY MASS INDEX: 27.83 KG/M2

## 2022-10-19 DIAGNOSIS — E89.0 POSTOPERATIVE HYPOTHYROIDISM: Primary | ICD-10-CM

## 2022-10-19 PROCEDURE — 99213 OFFICE O/P EST LOW 20 MIN: CPT | Performed by: INTERNAL MEDICINE

## 2022-10-19 NOTE — PROGRESS NOTES
"     Office Note      Date: 10/19/2022  Patient Name: Maye Maldondao  MRN: [id number]  : 1990    Chief Complaint   Patient presents with   • Hypothyroidism   • Graves' Disease       History of Present Illness:   Maye Maldonado is a 32 y.o. female who presents for Hypothyroidism and Graves' Disease  she has post surgical hypothyroidism. She is on 200 mcg per day and feels hyper. The dose was increased during a recent pregnancy  But has not been reduced since delivery  4 months ago.  She  Notes heat intolerance   And aggitation    Subjective          Review of Systems:   Review of Systems   Cardiovascular: Positive for palpitations.   Endocrine: Positive for heat intolerance.   Psychiatric/Behavioral: The patient is nervous/anxious.        The following portions of the patient's history were reviewed and updated as appropriate: allergies, current medications, past family history, past medical history, past social history, past surgical history and problem list.    Objective     Visit Vitals  /68   Pulse 103   Ht 162.6 cm (64\")   Wt 73.9 kg (163 lb)   SpO2 98%   BMI 27.98 kg/m²       Labs:    CBC w/DIFF  Lab Results   Component Value Date    WBC 5.17 2021    RBC 4.50 2021    HGB 11.8 (L) 2021    HCT 37.8 2021    MCV 84.0 2021    MCH 26.2 (L) 2021    MCHC 31.2 (L) 2021    RDW 13.2 2021    RDWSD 40.0 2021    MPV 9.9 2021     2021       T4  No results found for: FREET4    TSH  No results found for: TSHBASE     Physical Exam:  Physical Exam  Vitals reviewed.   Constitutional:       Appearance: Normal appearance.   HENT:      Head: Normocephalic and atraumatic.   Eyes:      Extraocular Movements: Extraocular movements intact.   Neck:      Comments: No palpable thyroid tissue  Lymphadenopathy:      Cervical: No cervical adenopathy.   Neurological:      Mental Status: She is alert.   Psychiatric:         Mood and Affect: Mood " normal.         Thought Content: Thought content normal.         Judgment: Judgment normal.         Assessment / Plan      Assessment & Plan:  Problem List Items Addressed This Visit        Other    Postoperative hypothyroidism - Primary    Current Assessment & Plan     Probably on too much medication. Will check tsh and go from there          Relevant Medications    levothyroxine (Synthroid) 200 MCG tablet    Other Relevant Orders    TSH    T4, Free        Bharath Don Argueta MD   10/19/2022

## 2022-10-20 DIAGNOSIS — E89.0 POSTOPERATIVE HYPOTHYROIDISM: Primary | ICD-10-CM

## 2022-10-20 LAB
T4 FREE SERPL-MCNC: 1.29 NG/DL (ref 0.93–1.7)
TSH SERPL DL<=0.005 MIU/L-ACNC: 0.58 UIU/ML (ref 0.27–4.2)

## 2022-10-20 RX ORDER — LEVOTHYROXINE SODIUM 175 UG/1
175 TABLET ORAL DAILY
Qty: 30 TABLET | Refills: 11 | Status: SHIPPED | OUTPATIENT
Start: 2022-10-20 | End: 2023-10-20

## 2022-10-20 RX ORDER — LEVOTHYROXINE SODIUM 175 UG/1
175 TABLET ORAL DAILY
Qty: 30 TABLET | Refills: 11 | Status: SHIPPED | OUTPATIENT
Start: 2022-10-20 | End: 2022-10-20 | Stop reason: SDUPTHER

## (undated) DEVICE — 3M™ STERI-STRIP™ REINFORCED ADHESIVE SKIN CLOSURES, R1547, 1/2 IN X 4 IN (12 MM X 100 MM), 6 STRIPS/ENVELOPE: Brand: 3M™ STERI-STRIP™

## (undated) DEVICE — PK MINOR SPLT 10

## (undated) DEVICE — GAUZE,SPONGE,4"X4",16PLY,XRAY,STRL,LF: Brand: MEDLINE

## (undated) DEVICE — CLN MEGADYNE TP SS

## (undated) DEVICE — GLV SURG BIOGEL LTX PF 7 1/2

## (undated) DEVICE — DISPOSABLE BIPOLAR FORCEPS 5 1/2" (14CM) SEMKIN, 0.7MM TIP AND 12 FT. (3.6M) CABLE: Brand: KIRWAN

## (undated) DEVICE — PENCL ROCKRSWCH MEGADYNE W/HOLSTR 10FT SS

## (undated) DEVICE — APPL CHLORAPREP TINTED 26ML TEAL

## (undated) DEVICE — SUT SILK 2/0 TIES 18IN A185H

## (undated) DEVICE — DISH PETRI 3.5IN MD STRL LF

## (undated) DEVICE — SUT SILK 3/0 TIES 18IN A184H

## (undated) DEVICE — ANTIBACTERIAL UNDYED BRAIDED (POLYGLACTIN 910), SYNTHETIC ABSORBABLE SUTURE: Brand: COATED VICRYL

## (undated) DEVICE — GOWN,REINFORCE,POLY,SIRUS,BREATH SLV,XLG: Brand: MEDLINE

## (undated) DEVICE — IRRIGATOR BULB ASEPTO 60CC STRL

## (undated) DEVICE — Device

## (undated) DEVICE — DRSNG SURESITE WNDW 4X4.5

## (undated) DEVICE — ELECTRD BLD MEGADYNE 2.5IN SS